# Patient Record
Sex: MALE | Race: WHITE | NOT HISPANIC OR LATINO
[De-identification: names, ages, dates, MRNs, and addresses within clinical notes are randomized per-mention and may not be internally consistent; named-entity substitution may affect disease eponyms.]

---

## 2023-07-19 PROBLEM — Z00.00 ENCOUNTER FOR PREVENTIVE HEALTH EXAMINATION: Status: ACTIVE | Noted: 2023-07-19

## 2023-07-21 ENCOUNTER — APPOINTMENT (OUTPATIENT)
Dept: ORTHOPEDIC SURGERY | Facility: CLINIC | Age: 23
End: 2023-07-21
Payer: COMMERCIAL

## 2023-07-21 VITALS — BODY MASS INDEX: 24.52 KG/M2 | WEIGHT: 185 LBS | HEIGHT: 73 IN

## 2023-07-21 DIAGNOSIS — Z78.9 OTHER SPECIFIED HEALTH STATUS: ICD-10-CM

## 2023-07-21 PROCEDURE — 73010 X-RAY EXAM OF SHOULDER BLADE: CPT | Mod: RT

## 2023-07-21 PROCEDURE — 99244 OFF/OP CNSLTJ NEW/EST MOD 40: CPT

## 2023-07-21 PROCEDURE — 73030 X-RAY EXAM OF SHOULDER: CPT | Mod: RT

## 2023-07-21 RX ORDER — MELOXICAM 15 MG/1
15 TABLET ORAL
Qty: 30 | Refills: 2 | Status: ACTIVE | COMMUNITY
Start: 2023-07-21 | End: 1900-01-01

## 2023-07-21 NOTE — CONSULT LETTER
[Dear  ___] : Dear  [unfilled], [Consult Letter:] : I had the pleasure of evaluating your patient, [unfilled]. [Please see my note below.] : Please see my note below. [Consult Closing:] : Thank you very much for allowing me to participate in the care of this patient.  If you have any questions, please do not hesitate to contact me. [Sincerely,] : Sincerely, [FreeTextEntry3] : Arnold Fritz M.D.\par Shoulder Surgery

## 2023-07-21 NOTE — ASSESSMENT
[FreeTextEntry1] : We reviewed the findings and the history.\par Questions were answered and concerns addressed.\par The options were outlined.\par The patient is prescribed Mobic. \par Short term vs. long term issues were discussed. \par Formal PT advised.\par He may continue to treat with his chiropractor.\par Should his sx persist, injections could be considered.\par \par Patient was seen by Dr. Arnold Haney.\par Patient was seen by Alice PAEZ under the supervision of Dr. Arnold Haney.\par Progress note was completed by Alice PAEZ.

## 2023-07-21 NOTE — DATA REVIEWED
[FreeTextEntry1] : MRI R SHOULDER 7/2023 ZP: \par To me the tear is minor at best. The biceps has some flattening.  The muscle is good.  There is no RTC pathology.  The joints are ok.

## 2023-07-21 NOTE — IMAGING
[Right] : right shoulder [FreeTextEntry1] : The GH and AC joints are OK. [FreeTextEntry5] : There is a Type I-II Acromion with an open physis. There is AC spurring.
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
## 2023-08-14 ENCOUNTER — APPOINTMENT (OUTPATIENT)
Dept: ORTHOPEDIC SURGERY | Facility: CLINIC | Age: 23
End: 2023-08-14
Payer: COMMERCIAL

## 2023-08-14 VITALS — BODY MASS INDEX: 24.52 KG/M2 | HEIGHT: 73 IN | WEIGHT: 185 LBS

## 2023-08-14 DIAGNOSIS — S43.51XA SPRAIN OF RIGHT ACROMIOCLAVICULAR JOINT, INITIAL ENCOUNTER: ICD-10-CM

## 2023-08-14 DIAGNOSIS — S43.421A SPRAIN OF RIGHT ROTATOR CUFF CAPSULE, INITIAL ENCOUNTER: ICD-10-CM

## 2023-08-14 PROCEDURE — 99214 OFFICE O/P EST MOD 30 MIN: CPT

## 2023-08-14 NOTE — PHYSICAL EXAM
[Right] : right shoulder [Sitting] : sitting [Moderate] : moderate [Mild] : mild [5 ___] : forward flexion 5[unfilled]/5 [5___] : external rotation 5[unfilled]/5 [] : no sensory deficits [Left] : left shoulder [FreeTextEntry9] : T10 [TWNoteComboBox4] : passive forward flexion 160 degrees [de-identified] : external rotation 60 degrees
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
## 2023-08-14 NOTE — ASSESSMENT
[FreeTextEntry1] : We discussed his course.  He is making gains.  PT is planned.  He will call for follow up. Questions answered.   Patient was seen by Dr. Arnold Haney. Entered by Yolette Jain acting as scribe.
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
